# Patient Record
Sex: FEMALE | Race: WHITE
[De-identification: names, ages, dates, MRNs, and addresses within clinical notes are randomized per-mention and may not be internally consistent; named-entity substitution may affect disease eponyms.]

---

## 2018-12-02 ENCOUNTER — HOSPITAL ENCOUNTER (EMERGENCY)
Age: 68
Discharge: HOME | End: 2018-12-02

## 2018-12-02 ENCOUNTER — HOSPITAL ENCOUNTER (EMERGENCY)
Dept: HOSPITAL 91 - E/R | Age: 68
Discharge: HOME | End: 2018-12-02
Payer: COMMERCIAL

## 2018-12-02 DIAGNOSIS — R10.813: ICD-10-CM

## 2018-12-02 DIAGNOSIS — R10.814: Primary | ICD-10-CM

## 2018-12-02 LAB
ADD MAN DIFF?: NO
ADD UMIC: NO
ALANINE AMINOTRANSFERASE: 28 IU/L (ref 13–69)
ALBUMIN/GLOBULIN RATIO: 1.32
ALBUMIN: 4.5 G/DL (ref 3.3–4.9)
ALKALINE PHOSPHATASE: 85 IU/L (ref 42–121)
ANION GAP: 10 (ref 5–13)
ASPARTATE AMINO TRANSFERASE: 38 IU/L (ref 15–46)
BASOPHIL #: 0 10^3/UL (ref 0–0.1)
BASOPHILS %: 0.6 % (ref 0–2)
BILIRUBIN,DIRECT: 0 MG/DL (ref 0–0.2)
BILIRUBIN,TOTAL: 0.2 MG/DL (ref 0.2–1.3)
BLOOD UREA NITROGEN: 14 MG/DL (ref 7–20)
CALCIUM: 9.4 MG/DL (ref 8.4–10.2)
CARBON DIOXIDE: 25 MMOL/L (ref 21–31)
CHLORIDE: 104 MMOL/L (ref 97–110)
CREATININE: 0.53 MG/DL (ref 0.44–1)
EOSINOPHILS #: 0.2 10^3/UL (ref 0–0.5)
EOSINOPHILS %: 3.3 % (ref 0–7)
GLOBULIN: 3.4 G/DL (ref 1.3–3.2)
GLUCOSE: 103 MG/DL (ref 70–220)
HEMATOCRIT: 42.1 % (ref 37–47)
HEMOGLOBIN: 13.7 G/DL (ref 12–16)
IMMATURE GRANS #M: 0.01 10^3/UL (ref 0–0.03)
IMMATURE GRANS % (M): 0.2 % (ref 0–0.43)
INR: 0.87
LIPASE: 148 U/L (ref 23–300)
LYMPHOCYTES #: 2.6 10^3/UL (ref 0.8–2.9)
LYMPHOCYTES %: 41 % (ref 15–51)
MEAN CORPUSCULAR HEMOGLOBIN: 28.1 PG (ref 29–33)
MEAN CORPUSCULAR HGB CONC: 32.5 G/DL (ref 32–37)
MEAN CORPUSCULAR VOLUME: 86.3 FL (ref 82–101)
MEAN PLATELET VOLUME: 10.1 FL (ref 7.4–10.4)
MONOCYTE #: 0.4 10^3/UL (ref 0.3–0.9)
MONOCYTES %: 5.9 % (ref 0–11)
NEUTROPHIL #: 3.1 10^3/UL (ref 1.6–7.5)
NEUTROPHILS %: 49 % (ref 39–77)
NUCLEATED RED BLOOD CELLS #: 0 10^3/UL (ref 0–0)
NUCLEATED RED BLOOD CELLS%: 0 /100WBC (ref 0–0)
PLATELET COUNT: 323 10^3/UL (ref 140–415)
POTASSIUM: 4.5 MMOL/L (ref 3.5–5.1)
PROTIME: 11.9 SEC (ref 11.9–14.9)
PT RATIO: 0.9
RED BLOOD COUNT: 4.88 10^6/UL (ref 4.2–5.4)
RED CELL DISTRIBUTION WIDTH: 12.4 % (ref 11.5–14.5)
SODIUM: 139 MMOL/L (ref 135–144)
TOTAL PROTEIN: 7.9 G/DL (ref 6.1–8.1)
TROPONIN-I: < 0.012 NG/ML (ref 0–0.12)
UR ASCORBIC ACID: 20 MG/DL
UR BILIRUBIN (DIP): NEGATIVE MG/DL
UR BLOOD (DIP): NEGATIVE MG/DL
UR CLARITY: CLEAR
UR COLOR: YELLOW
UR GLUCOSE (DIP): NEGATIVE MG/DL
UR KETONES (DIP): NEGATIVE MG/DL
UR LEUKOCYTE ESTERASE (DIP): NEGATIVE LEU/UL
UR NITRITE (DIP): NEGATIVE MG/DL
UR PH (DIP): 5 (ref 5–9)
UR SPECIFIC GRAVITY (DIP): 1.01 (ref 1–1.03)
UR TOTAL PROTEIN (DIP): NEGATIVE MG/DL
UR UROBILINOGEN (DIP): NEGATIVE MG/DL
WHITE BLOOD COUNT: 6.4 10^3/UL (ref 4.8–10.8)

## 2018-12-02 PROCEDURE — 99285 EMERGENCY DEPT VISIT HI MDM: CPT

## 2018-12-02 PROCEDURE — 85025 COMPLETE CBC W/AUTO DIFF WBC: CPT

## 2018-12-02 PROCEDURE — 85610 PROTHROMBIN TIME: CPT

## 2018-12-02 PROCEDURE — 36415 COLL VENOUS BLD VENIPUNCTURE: CPT

## 2018-12-02 PROCEDURE — 71045 X-RAY EXAM CHEST 1 VIEW: CPT

## 2018-12-02 PROCEDURE — 80053 COMPREHEN METABOLIC PANEL: CPT

## 2018-12-02 PROCEDURE — 81003 URINALYSIS AUTO W/O SCOPE: CPT

## 2018-12-02 PROCEDURE — 84484 ASSAY OF TROPONIN QUANT: CPT

## 2018-12-02 PROCEDURE — 93005 ELECTROCARDIOGRAM TRACING: CPT

## 2018-12-02 PROCEDURE — 74176 CT ABD & PELVIS W/O CONTRAST: CPT

## 2018-12-02 PROCEDURE — 83690 ASSAY OF LIPASE: CPT

## 2018-12-02 RX ADMIN — ALUMINUM HYDROXIDE, MAGNESIUM HYDROXIDE, DIMETHICONE 1 ML: 200; 200; 20 SUSPENSION ORAL at 10:56

## 2019-06-14 ENCOUNTER — HOSPITAL ENCOUNTER (EMERGENCY)
Dept: HOSPITAL 10 - E/R | Age: 69
Discharge: HOME | End: 2019-06-14
Payer: COMMERCIAL

## 2019-06-14 ENCOUNTER — HOSPITAL ENCOUNTER (EMERGENCY)
Dept: HOSPITAL 91 - E/R | Age: 69
Discharge: HOME | End: 2019-06-14
Payer: COMMERCIAL

## 2019-06-14 VITALS
WEIGHT: 154.1 LBS | BODY MASS INDEX: 28.36 KG/M2 | HEIGHT: 62 IN | WEIGHT: 154.1 LBS | HEIGHT: 62 IN | BODY MASS INDEX: 28.36 KG/M2

## 2019-06-14 VITALS — RESPIRATION RATE: 15 BRPM | DIASTOLIC BLOOD PRESSURE: 85 MMHG | SYSTOLIC BLOOD PRESSURE: 155 MMHG | HEART RATE: 65 BPM

## 2019-06-14 DIAGNOSIS — R10.13: Primary | ICD-10-CM

## 2019-06-14 DIAGNOSIS — R30.0: ICD-10-CM

## 2019-06-14 LAB
ADD MAN DIFF?: NO
ADD UMIC: YES
ALANINE AMINOTRANSFERASE: 25 IU/L (ref 13–69)
ALBUMIN/GLOBULIN RATIO: 1.15
ALBUMIN: 4.4 G/DL (ref 3.3–4.9)
ALKALINE PHOSPHATASE: 85 IU/L (ref 42–121)
ANION GAP: 8 (ref 5–13)
ASPARTATE AMINO TRANSFERASE: 36 IU/L (ref 15–46)
BASOPHIL #: 0.1 10^3/UL (ref 0–0.1)
BASOPHILS %: 0.5 % (ref 0–2)
BILIRUBIN,DIRECT: 0 MG/DL (ref 0–0.2)
BILIRUBIN,TOTAL: 0.4 MG/DL (ref 0.2–1.3)
BLOOD UREA NITROGEN: 14 MG/DL (ref 7–20)
CALCIUM: 9.6 MG/DL (ref 8.4–10.2)
CARBON DIOXIDE: 25 MMOL/L (ref 21–31)
CHLORIDE: 106 MMOL/L (ref 97–110)
CREATININE: 0.61 MG/DL (ref 0.44–1)
EOSINOPHILS #: 0.3 10^3/UL (ref 0–0.5)
EOSINOPHILS %: 2.7 % (ref 0–7)
GLOBULIN: 3.8 G/DL (ref 1.3–3.2)
GLUCOSE: 93 MG/DL (ref 70–220)
HEMATOCRIT: 39.3 % (ref 37–47)
HEMOGLOBIN: 13 G/DL (ref 12–16)
IMMATURE GRANS #M: 0.03 10^3/UL (ref 0–0.03)
IMMATURE GRANS % (M): 0.3 % (ref 0–0.43)
LIPASE: 121 U/L (ref 23–300)
LYMPHOCYTES #: 3.5 10^3/UL (ref 0.8–2.9)
LYMPHOCYTES %: 33.7 % (ref 15–51)
MEAN CORPUSCULAR HEMOGLOBIN: 28.4 PG (ref 29–33)
MEAN CORPUSCULAR HGB CONC: 33.1 G/DL (ref 32–37)
MEAN CORPUSCULAR VOLUME: 85.8 FL (ref 82–101)
MEAN PLATELET VOLUME: 9.9 FL (ref 7.4–10.4)
MONOCYTE #: 0.6 10^3/UL (ref 0.3–0.9)
MONOCYTES %: 5.9 % (ref 0–11)
NEUTROPHIL #: 5.9 10^3/UL (ref 1.6–7.5)
NEUTROPHILS %: 56.9 % (ref 39–77)
NUCLEATED RED BLOOD CELLS #: 0 10^3/UL (ref 0–0)
NUCLEATED RED BLOOD CELLS%: 0 /100WBC (ref 0–0)
PLATELET COUNT: 309 10^3/UL (ref 140–415)
POTASSIUM: 3.9 MMOL/L (ref 3.5–5.1)
RED BLOOD COUNT: 4.58 10^6/UL (ref 4.2–5.4)
RED CELL DISTRIBUTION WIDTH: 12.4 % (ref 11.5–14.5)
SODIUM: 139 MMOL/L (ref 135–144)
TOTAL PROTEIN: 8.2 G/DL (ref 6.1–8.1)
TROPONIN-I: < 0.012 NG/ML (ref 0–0.12)
UR ASCORBIC ACID: 20 MG/DL
UR BILIRUBIN (DIP): NEGATIVE MG/DL
UR BLOOD (DIP): NEGATIVE MG/DL
UR CLARITY: (no result)
UR COLOR: YELLOW
UR GLUCOSE (DIP): NEGATIVE MG/DL
UR KETONES (DIP): NEGATIVE MG/DL
UR LEUKOCYTE ESTERASE (DIP): (no result) LEU/UL
UR MUCUS: (no result) /HPF
UR NITRITE (DIP): NEGATIVE MG/DL
UR PH (DIP): 5 (ref 5–9)
UR RBC: 0 /HPF (ref 0–5)
UR SPECIFIC GRAVITY (DIP): 1.01 (ref 1–1.03)
UR TOTAL PROTEIN (DIP): NEGATIVE MG/DL
UR UROBILINOGEN (DIP): NEGATIVE MG/DL
UR WBC: 2 /HPF (ref 0–5)
URINE LEUKOCYTE EST (DIP) POC: (no result)
URINE PH (DIP) POC: 5.5 (ref 5–8.5)
WHITE BLOOD COUNT: 10.3 10^3/UL (ref 4.8–10.8)

## 2019-06-14 PROCEDURE — 87086 URINE CULTURE/COLONY COUNT: CPT

## 2019-06-14 PROCEDURE — 83690 ASSAY OF LIPASE: CPT

## 2019-06-14 PROCEDURE — 84484 ASSAY OF TROPONIN QUANT: CPT

## 2019-06-14 PROCEDURE — 96374 THER/PROPH/DIAG INJ IV PUSH: CPT

## 2019-06-14 PROCEDURE — 81003 URINALYSIS AUTO W/O SCOPE: CPT

## 2019-06-14 PROCEDURE — 93005 ELECTROCARDIOGRAM TRACING: CPT

## 2019-06-14 PROCEDURE — 99284 EMERGENCY DEPT VISIT MOD MDM: CPT

## 2019-06-14 PROCEDURE — 81001 URINALYSIS AUTO W/SCOPE: CPT

## 2019-06-14 PROCEDURE — 85025 COMPLETE CBC W/AUTO DIFF WBC: CPT

## 2019-06-14 PROCEDURE — 36415 COLL VENOUS BLD VENIPUNCTURE: CPT

## 2019-06-14 PROCEDURE — 80053 COMPREHEN METABOLIC PANEL: CPT

## 2019-06-14 RX ADMIN — DICYCLOMINE HYDROCHLORIDE 1 MG: 10 CAPSULE ORAL at 20:20

## 2019-06-14 RX ADMIN — ALUMINUM HYDROXIDE, MAGNESIUM HYDROXIDE, DIMETHICONE 1 ML: 200; 200; 20 SUSPENSION ORAL at 20:21

## 2019-06-14 RX ADMIN — FAMOTIDINE 1 MG: 10 INJECTION, SOLUTION INTRAVENOUS at 20:20

## 2019-06-14 NOTE — ERD
ER Documentation


Chief Complaint


Chief Complaint





PT REPORTS LLQ PAIN RADIATING TO BACK WITH FREQUENT URINATION





HPI


68-year-old female who presents to the emergency room complaining of epigastric 


abdominal pain.  She describes several weeks if not longer of epigastric 


abdominal discomfort.  Patient states that is always postprandial usually with 


fried or fatty foods.  The patient also describes approximately 1 week of 


dysuria and possible frequent urination.  No fevers or chills, no left flank 


pain, right flank pain.  She denies any cough or shortness of breath.  Symptoms 


are moderate currently.





ROS


All systems reviewed and are negative except as per history of present illness.





Medications


Home Meds


Active Scripts


Famotidine* (Pepcid*) 20 Mg Tablet, 20 MG PO BID for 4 Days, TAB


   Prov:BRANDAN POOLE MD         6/14/19


Polyethylene Glycol* (Miralax*) 17 Gm Powd.pack, 17 GM PO DAILY, #7


   Prov:GEETA BERMAN MD         12/2/18


Docusate Sodium* (Colace*) 100 Mg Capsule, 100 MG PO BID, #60 CAP


   Prov:GEETA BERMAN MD         12/2/18


Nitrofurantoin Monohyd Macrocr* (Macrobid*) 100 Mg Capsr, 100 MG PO BID for 7 


Days, CAP


   Prov:LYNNE POLLARD PA-C         8/14/16





Allergies


Allergies:  


Coded Allergies:  


     No Known Allergies (Verified  Allergy, Unknown, 12/2/18)





PMhx/Soc


History of Surgery:  Yes (HYSTERECTOMY )


Anesthesia Reaction:  No


Hx Neurological Disorder:  No


Hx Respiratory Disorders:  No


Hx Cardiac Disorders:  No


Hx Psychiatric Problems:  Yes (DEPRESSION )


Hx Miscellaneous Medical Probl:  No


Hx Alcohol Use:  No


Hx Substance Use:  No


Hx Tobacco Use:  No


Smoking Status:  Never smoker





FmHx


Family History:  No diabetes





Physical Exam


Vitals





Vital Signs


  Date      Temp  Pulse  Resp  B/P (MAP)   Pulse Ox  O2          O2 Flow    FiO2


Time                                                 Delivery    Rate


   6/14/19           64    17      155/85        97  Room Air


     21:03                          (108)


   6/14/19  98.6     84    16      155/74       100


     18:23                          (101)





Physical Exam


General: Well developed, well nourished, no acute distress


Head: Normocephalic, atraumatic.


Eyes: Pupils equally reactive, EOM intact


ENT: Moist mucous membranes


Neck: Supple, no lymphadenopathy


Respiratory: Lungs clear bilaterally, no distress


Cardiovascular: RRR, no murmurs, rubs, or gallops


Abdominal: Soft, non-tender, non-distended, no peritoneal signs, negative Hernandez


sign


Back: No CVAT bilaterally


: Deferred


MSK: No edema, no unilateral swelling, 5/5 strength


Neurologic: Alert and oriented, moving all extremities, normal speech, no focal 


weakness, no cerebellar signs


Skin: No rash


Psych: Normal mood


Result Diagram:  


6/14/19 2015 6/14/19 2015





Results 24 hrs





Laboratory Tests


      Test
                                  6/14/19
20:15  6/14/19
20:24


      White Blood Count                      10.3 10^3/ul


      Red Blood Count                        4.58 10^6/ul


      Hemoglobin                                13.0 g/dl


      Hematocrit                                   39.3 %


      Mean Corpuscular Volume                     85.8 fl


      Mean Corpuscular Hemoglobin                 28.4 pg


      Mean Corpuscular Hemoglobin
Concent      33.1 g/dl 
  



      Red Cell Distribution Width                  12.4 %


      Platelet Count                          309 10^3/UL


      Mean Platelet Volume                         9.9 fl


      Immature Granulocytes %                     0.300 %


      Neutrophils %                                56.9 %


      Lymphocytes %                                33.7 %


      Monocytes %                                   5.9 %


      Eosinophils %                                 2.7 %


      Basophils %                                   0.5 %


      Nucleated Red Blood Cells %             0.0 /100WBC


      Immature Granulocytes #               0.030 10^3/ul


      Neutrophils #                           5.9 10^3/ul


      Lymphocytes #                           3.5 10^3/ul


      Monocytes #                             0.6 10^3/ul


      Eosinophils #                           0.3 10^3/ul


      Basophils #                             0.1 10^3/ul


      Nucleated Red Blood Cells #             0.0 10^3/ul


      Urine Color                          YELLOW


      Urine Clarity
                       SLIGHTLY
CLOUDY  



      Urine pH                                        5.0


      Urine Specific Gravity                        1.010


      Urine Ketones                        NEGATIVE mg/dL


      Urine Nitrite                        NEGATIVE mg/dL


      Urine Bilirubin                      NEGATIVE mg/dL


      Urine Urobilinogen                   NEGATIVE mg/dL


      Urine Leukocyte Esterase                  1+ Ilana/ul


      Urine Microscopic RBC                        0 /HPF


      Urine Microscopic WBC                        2 /HPF


      Urine Mucus                          FEW /HPF


      Urine Hemoglobin                     NEGATIVE mg/dL


      Urine Glucose                        NEGATIVE mg/dL


      Urine Total Protein                  NEGATIVE mg/dl


      Sodium Level                             139 mmol/L


      Potassium Level                          3.9 mmol/L


      Chloride Level                           106 mmol/L


      Carbon Dioxide Level                      25 mmol/L


      Anion Gap                                         8


      Blood Urea Nitrogen                        14 mg/dl


      Creatinine                               0.61 mg/dl


      Est Glomerular Filtrat Rate
mL/min   > 60 mL/min 
    



      Glucose Level                              93 mg/dl


      Calcium Level                             9.6 mg/dl


      Total Bilirubin                           0.4 mg/dl


      Direct Bilirubin                         0.00 mg/dl


      Indirect Bilirubin                        0.4 mg/dl


      Aspartate Amino Transf
(AST/SGOT)          36 IU/L 
  



      Alanine Aminotransferase
(ALT/SGPT)        25 IU/L 
  



      Alkaline Phosphatase                        85 IU/L


      Troponin I                           < 0.012 ng/ml


      Total Protein                              8.2 g/dl


      Albumin                                    4.4 g/dl


      Globulin                                  3.80 g/dl


      Albumin/Globulin Ratio                         1.15


      Lipase                                      121 U/L


      Bedside Urine pH (LAB)                                         5.5


      Bedside Urine Protein (LAB)                           Negative


      Bedside Urine Glucose (UA)                            Negative


      Bedside Urine Ketones (LAB)                           Negative


      Bedside Urine Blood                                   Trace-lysed


      Bedside Urine Nitrite (LAB)                           Negative


      Bedside Urine Leukocyte
Esterase (L  
                         1+ 






Current Medications


 Medications
   Dose
          Sig/Oli
       Start Time
   Status  Last


 (Trade)       Ordered        Route
 PRN     Stop Time              Admin
Dose


                              Reason                                Admin


 Famotidine
    20 mg          ONCE  STAT
    6/14/19       DC           6/14/19


(Pepcid Iv)                   IV
            20:16
                       20:20



                                             6/14/19 20:17


                40 ml          ONCE  STAT
    6/14/19       DC           6/14/19


Miscellaneous                 PO
            20:16
                       20:21




 Medication
                                6/14/19 20:17


 (Gi Cocktail


(2))


 Dicyclomine    10 mg          ONCE  ONCE
    6/14/19       DC           6/14/19


HCl
                          PO
            20:30
                       20:20



(Bentyl)                                     6/14/19 20:31








Procedures/MDM


EKG, MONITORS, & DIAGNOSTIC IMAGING:


EKG: I reviewed and interpreted a 12-lead EKG. 


Rhythm: Normal sinus rhythm


ST Changes: No contiguous ST segment elevations


T waves: No contiguous T wave inversions


Impression: No evidence of acute cardiac ischemia





LAB INTERPRETATION:


I reviewed the laboratory testing and it shows no evidence of acute process





MEDICAL DECISION MAKING:


Patient presents with a multitude of complaints of her subacute timeframe.  Her 


epigastric abdominal discomfort seems to be consistent with likely dyspepsia 


versus peptic ulcer disease versus GERD.  Low concern for acute cholecystitis, 


cholelithiasis or biliary colic.  No indication for ultrasound or CT imaging at 


this time given benign abdominal examination.  Unclear etiology of the patient's


dysuria.  The patient could have potentially have a UTI.  No signs of 


pyelonephritis.  Symptoms are again subacute.  Patient has epigastric 


discomfort, no exertional symptoms, very low clinical concern for cardiac et


iology though EKG and troponin would be appropriate.





ER COURSE:


* Patient given a GI cocktail with dramatic improvement of symptomatology.  


  Laboratory testing and diagnostic imaging is unrevealing.


* Urinalysis not consistent with UTI.  Urine culture is been sent.  No 


  indication at this time for empiric antibiotics given the risks outweigh the 


  benefits.


* Patient can be safely discharged and was advised to follow-up with 


  gastroenterology for endoscopy.





CONSULTATION:


None





DISPOSITION PLAN:


The patient does not have an identifiable emergent medical condition that 


warrants inpatient hospitalization at this time.  The patient is deemed safe for


discharge with outpatient follow-up.





We discussed follow up with the patient's primary care doctor within 24 to 48 


hours as needed.  We also discussed return to the emergency room for worsening 


symptoms or worsening condition.





Outpatient referral: Gastroenterology





Discharge Medications:


Pepcid





Departure


Diagnosis:  


   Primary Impression:  


   Epigastric abdominal pain


   Additional Impressions:  


   Dyspepsia


   Dysuria


Condition:  Stable


Patient Instructions:  Epigastric Pain (Uncertain Cause)


Referrals:  


GISSELLE HORTA MD








[facility]


YOU HAVE RECEIVED A MEDICAL SCREENING EXAM AND THE RESULTS INDICATE THAT YOU DO 


NOT HAVE A CONDITION THAT REQUIRES URGENT TREATMENT IN THE EMERGENCY DEPARTMENT.





FURTHER EVALUATION AND TREATMENT OF YOUR CONDITION CAN WAIT UNTIL YOU ARE SEEN 


IN YOUR DOCTORS OFFICE WITHIN THE NEXT 1-2 DAYS. IT IS YOUR RESPONSIBILITY TO 


MAKE AN APPOINTMENT FOR FOLOW-UP CARE.





IF YOU HAVE A PRIMARY DOCTOR


--you should call your primary doctor and schedule an appointment





IF YOU DO NOT HAVE A PRIMARY DOCTOR YOU CAN CALL OUR PHYSICIAN REFERRAL HOTLINE 


AT


 [phone] 





IF YOU CAN NOT AFFORD TO SEE A PHYSICIAN YOU CAN CHOSE FROM THE FOLLOWING 


[facility] CL[facility]ICS





[facility] [phone] [address] [facility] [facility] [facility]. [facility] [phone] [address] [facility]. [facility] [phone] [address] [facility]. [facility] [phone] [address] [facility]. [facility] [phone] [address] [facility]. [facility]. [phone] [address] [facility]


YOU HAVE RECEIVED A MEDICAL SCREENING EXAM AND THE RESULTS INDICATE THAT YOU DO 


NOT HAVE A CONDITION THAT REQUIRES URGENT TREATMENT IN THE EMERGENCY DEPARTMENT.





FURTHER EVALUATION AND TREATMENT OF YOUR CONDITION CAN WAIT UNTIL YOU ARE SEEN 


IN YOUR DOCTORS OFFICE WITHIN THE NEXT 1-2 DAYS. IT IS YOUR RESPONSIBILITY TO 


MAKE AN APPOINTMENT FOR FOLOW-UP CARE.





IF YOU HAVE A PRIMARY DOCTOR


--you should call your primary doctor and schedule and appointment





IF YOU DO NOT HAVE A PRIMARY DOCTOR YOU CAN CALL OUR PHYSICIAN REFERRAL HOTLINE 


AT [phone].





IF YOU CAN NOT AFFORD TO SEE A PHYSICIAN YOU CAN CHOSE FROM THE FOLLOWING [facility]


INSTITUTIONS:





[facility]


[address] [place] [address]





[facility]


[address] [place] [address]





[facility]


[address] [place] [address]





Additional Instructions:  


Call your primary care doctor TOMORROW for an appointment during the next 1 


WEEK.Tell the  that you were referred from this facility.See the doctor


sooner or return here if your  condition worsens before your appointment time.











BRANDAN POOLE MD          Jun 14, 2019 21:25

## 2019-06-21 ENCOUNTER — HOSPITAL ENCOUNTER (EMERGENCY)
Dept: HOSPITAL 91 - E/R | Age: 69
Discharge: HOME | End: 2019-06-21
Payer: COMMERCIAL

## 2019-06-21 ENCOUNTER — HOSPITAL ENCOUNTER (EMERGENCY)
Dept: HOSPITAL 10 - E/R | Age: 69
Discharge: HOME | End: 2019-06-21
Payer: COMMERCIAL

## 2019-06-21 VITALS — HEART RATE: 81 BPM | DIASTOLIC BLOOD PRESSURE: 81 MMHG | SYSTOLIC BLOOD PRESSURE: 144 MMHG | RESPIRATION RATE: 16 BRPM

## 2019-06-21 VITALS
BODY MASS INDEX: 29.34 KG/M2 | WEIGHT: 155.43 LBS | WEIGHT: 155.43 LBS | HEIGHT: 61 IN | BODY MASS INDEX: 29.34 KG/M2 | HEIGHT: 61 IN

## 2019-06-21 DIAGNOSIS — N30.00: Primary | ICD-10-CM

## 2019-06-21 LAB
ADD MAN DIFF?: NO
ADD UMIC: YES
ALANINE AMINOTRANSFERASE: 33 IU/L (ref 13–69)
ALBUMIN/GLOBULIN RATIO: 1.33
ALBUMIN: 4.4 G/DL (ref 3.3–4.9)
ALKALINE PHOSPHATASE: 83 IU/L (ref 42–121)
ANION GAP: 12 (ref 5–13)
ASPARTATE AMINO TRANSFERASE: 38 IU/L (ref 15–46)
BASOPHIL #: 0 10^3/UL (ref 0–0.1)
BASOPHILS %: 0.4 % (ref 0–2)
BILIRUBIN,DIRECT: 0 MG/DL (ref 0–0.2)
BILIRUBIN,TOTAL: 0.3 MG/DL (ref 0.2–1.3)
BLOOD UREA NITROGEN: 13 MG/DL (ref 7–20)
CALCIUM: 9.7 MG/DL (ref 8.4–10.2)
CARBON DIOXIDE: 24 MMOL/L (ref 21–31)
CHLORIDE: 105 MMOL/L (ref 97–110)
CREATININE: 0.69 MG/DL (ref 0.44–1)
EOSINOPHILS #: 0.2 10^3/UL (ref 0–0.5)
EOSINOPHILS %: 2.5 % (ref 0–7)
GLOBULIN: 3.3 G/DL (ref 1.3–3.2)
GLUCOSE: 110 MG/DL (ref 70–220)
HEMATOCRIT: 38.4 % (ref 37–47)
HEMOGLOBIN: 12.8 G/DL (ref 12–16)
IMMATURE GRANS #M: 0.02 10^3/UL (ref 0–0.03)
IMMATURE GRANS % (M): 0.2 % (ref 0–0.43)
LIPASE: 119 U/L (ref 23–300)
LYMPHOCYTES #: 3.2 10^3/UL (ref 0.8–2.9)
LYMPHOCYTES %: 35.2 % (ref 15–51)
MEAN CORPUSCULAR HEMOGLOBIN: 28.6 PG (ref 29–33)
MEAN CORPUSCULAR HGB CONC: 33.3 G/DL (ref 32–37)
MEAN CORPUSCULAR VOLUME: 85.9 FL (ref 82–101)
MEAN PLATELET VOLUME: 10 FL (ref 7.4–10.4)
MONOCYTE #: 0.6 10^3/UL (ref 0.3–0.9)
MONOCYTES %: 7 % (ref 0–11)
NEUTROPHIL #: 5 10^3/UL (ref 1.6–7.5)
NEUTROPHILS %: 54.7 % (ref 39–77)
NUCLEATED RED BLOOD CELLS #: 0 10^3/UL (ref 0–0)
NUCLEATED RED BLOOD CELLS%: 0 /100WBC (ref 0–0)
PLATELET COUNT: 333 10^3/UL (ref 140–415)
POTASSIUM: 4.3 MMOL/L (ref 3.5–5.1)
RED BLOOD COUNT: 4.47 10^6/UL (ref 4.2–5.4)
RED CELL DISTRIBUTION WIDTH: 12.4 % (ref 11.5–14.5)
SODIUM: 141 MMOL/L (ref 135–144)
TOTAL PROTEIN: 7.7 G/DL (ref 6.1–8.1)
TROPONIN-I: < 0.012 NG/ML (ref 0–0.12)
UR ASCORBIC ACID: 40 MG/DL
UR BILIRUBIN (DIP): NEGATIVE MG/DL
UR BLOOD (DIP): NEGATIVE MG/DL
UR CLARITY: (no result)
UR COLOR: (no result)
UR GLUCOSE (DIP): NEGATIVE MG/DL
UR KETONES (DIP): NEGATIVE MG/DL
UR LEUKOCYTE ESTERASE (DIP): (no result) LEU/UL
UR NITRITE (DIP): NEGATIVE MG/DL
UR PH (DIP): 5 (ref 5–9)
UR RBC: 1 /HPF (ref 0–5)
UR SPECIFIC GRAVITY (DIP): 1.01 (ref 1–1.03)
UR TOTAL PROTEIN (DIP): NEGATIVE MG/DL
UR UROBILINOGEN (DIP): NEGATIVE MG/DL
UR WBC: 2 /HPF (ref 0–5)
WHITE BLOOD COUNT: 9.1 10^3/UL (ref 4.8–10.8)

## 2019-06-21 PROCEDURE — 74176 CT ABD & PELVIS W/O CONTRAST: CPT

## 2019-06-21 PROCEDURE — 83690 ASSAY OF LIPASE: CPT

## 2019-06-21 PROCEDURE — 84484 ASSAY OF TROPONIN QUANT: CPT

## 2019-06-21 PROCEDURE — 99285 EMERGENCY DEPT VISIT HI MDM: CPT

## 2019-06-21 PROCEDURE — 93005 ELECTROCARDIOGRAM TRACING: CPT

## 2019-06-21 PROCEDURE — 81001 URINALYSIS AUTO W/SCOPE: CPT

## 2019-06-21 PROCEDURE — 96375 TX/PRO/DX INJ NEW DRUG ADDON: CPT

## 2019-06-21 PROCEDURE — 85025 COMPLETE CBC W/AUTO DIFF WBC: CPT

## 2019-06-21 PROCEDURE — 80053 COMPREHEN METABOLIC PANEL: CPT

## 2019-06-21 PROCEDURE — 36415 COLL VENOUS BLD VENIPUNCTURE: CPT

## 2019-06-21 PROCEDURE — 96374 THER/PROPH/DIAG INJ IV PUSH: CPT

## 2019-06-21 RX ADMIN — ONDANSETRON HYDROCHLORIDE 1 MG: 2 INJECTION, SOLUTION INTRAMUSCULAR; INTRAVENOUS at 20:06

## 2019-06-21 NOTE — ERD
ER Documentation


Chief Complaint


Chief Complaint





lower AP w/ nausea, dysuria x1 day. no diarrhea/vomiting





HPI


Patient is a 68-year-old female with no medical problems who presents with 


abdominal pain.  The patient has left upper quadrant abdominal pain which comes 


and goes.  She feels like her abdomen is "swollen".  She said that she has a 


burning type pain.  She was seen on June 14 and had labs done at that time but 


no imaging was done.  Upon review of old medical records this is the patient's 


fourth visit to the ER since 2016.  She does not currently have a primary 


doctor.





ROS


All systems reviewed and are negative except as per history of present illness.





Medications


Home Meds


Active Scripts


Docusate Sodium* (Colace*) 100 Mg Capsule, 100 MG PO TID, #30 CAP


   Prov:TANK HAMMONDS MD         6/21/19


Ibuprofen* (Motrin*) 600 Mg Tab, 600 MG PO Q6H PRN for PAIN AND OR ELEVATED 


TEMP, #30 TAB


   Prov:TANK HAMMONDS MD         6/21/19


Cephalexin* (Keflex*) 500 Mg Capsule, 500 MG PO QID for 7 Days, CAP


   Prov:TANK HAMMONDS MD         6/21/19


Famotidine* (Pepcid*) 20 Mg Tablet, 20 MG PO BID for 4 Days, TAB


   Prov:BRANDAN POOLE MD         6/14/19


Polyethylene Glycol* (Miralax*) 17 Gm Powd.pack, 17 GM PO DAILY, #7


   Prov:GEETA BERMAN MD         12/2/18


Docusate Sodium* (Colace*) 100 Mg Capsule, 100 MG PO BID, #60 CAP


   Prov:GEETA BERMAN MD         12/2/18


Nitrofurantoin Monohyd Macrocr* (Macrobid*) 100 Mg Capsr, 100 MG PO BID for 7 


Days, CAP


   Prov:LYNNE POLLARD PA-C         8/14/16





Allergies


Allergies:  


Coded Allergies:  


     No Known Allergies (Verified  Allergy, Unknown, 12/2/18)





PMhx/Soc


History of Surgery:  Yes (HYSTERECTOMY )


Anesthesia Reaction:  No


Hx Neurological Disorder:  No


Hx Respiratory Disorders:  No


Hx Cardiac Disorders:  No


Hx Psychiatric Problems:  Yes (DEPRESSION )


Hx Miscellaneous Medical Probl:  No


Hx Alcohol Use:  No


Hx Substance Use:  No


Hx Tobacco Use:  No


Smoking Status:  Never smoker





FmHx


Family History:  No diabetes





Physical Exam


Vitals





Vital Signs


  Date      Temp  Pulse  Resp  B/P (MAP)   Pulse Ox  O2          O2 Flow    FiO2


Time                                                 Delivery    Rate


   6/21/19  97.1     85    16      153/78        97


     19:47                          (103)


   6/21/19  97.1     77    16      153/78        97


     19:31                          (103)





Physical Exam


Const:   No acute distress


Head:   Atraumatic 


Eyes:    Normal Conjunctiva


ENT:    Normal External Ears, Nose and Mouth.


Neck:               Full range of motion. No meningismus.


Resp:   Clear to auscultation bilaterally


Cardio:   Regular rate and rhythm, no murmurs


Abd:    Upper abdominal tenderness to palpation without rebound or guarding


Skin:   No petechiae or rashes


Back:   No midline or flank tenderness


Ext:    No cyanosis, or edema


Neur:   Awake and alert


Psych:    Normal Mood and Affect


Result Diagram:  


6/21/19 2000 6/21/19 2000





Results 24 hrs





Laboratory Tests


              Test
                                  6/21/19
20:00


              White Blood Count                       9.1 10^3/ul


              Red Blood Count                        4.47 10^6/ul


              Hemoglobin                                12.8 g/dl


              Hematocrit                                   38.4 %


              Mean Corpuscular Volume                     85.9 fl


              Mean Corpuscular Hemoglobin                 28.6 pg


              Mean Corpuscular Hemoglobin
Concent      33.3 g/dl 



              Red Cell Distribution Width                  12.4 %


              Platelet Count                          333 10^3/UL


              Mean Platelet Volume                        10.0 fl


              Immature Granulocytes %                     0.200 %


              Neutrophils %                                54.7 %


              Lymphocytes %                                35.2 %


              Monocytes %                                   7.0 %


              Eosinophils %                                 2.5 %


              Basophils %                                   0.4 %


              Nucleated Red Blood Cells %             0.0 /100WBC


              Immature Granulocytes #               0.020 10^3/ul


              Neutrophils #                           5.0 10^3/ul


              Lymphocytes #                           3.2 10^3/ul


              Monocytes #                             0.6 10^3/ul


              Eosinophils #                           0.2 10^3/ul


              Basophils #                             0.0 10^3/ul


              Nucleated Red Blood Cells #             0.0 10^3/ul


              Urine Color                          VERONICA


              Urine Clarity                        CLOUDY


              Urine pH                                        5.0


              Urine Specific Gravity                        1.015


              Urine Ketones                        NEGATIVE mg/dL


              Urine Nitrite                        NEGATIVE mg/dL


              Urine Bilirubin                      NEGATIVE mg/dL


              Urine Urobilinogen                   NEGATIVE mg/dL


              Urine Leukocyte Esterase             TRACE Ilana/ul


              Urine Microscopic RBC                        1 /HPF


              Urine Microscopic WBC                        2 /HPF


              Urine Hemoglobin                     NEGATIVE mg/dL


              Urine Glucose                        NEGATIVE mg/dL


              Urine Total Protein                  NEGATIVE mg/dl


              Sodium Level                             141 mmol/L


              Potassium Level                          4.3 mmol/L


              Chloride Level                           105 mmol/L


              Carbon Dioxide Level                      24 mmol/L


              Anion Gap                                        12


              Blood Urea Nitrogen                        13 mg/dl


              Creatinine                               0.69 mg/dl


              Est Glomerular Filtrat Rate
mL/min   > 60 mL/min 



              Glucose Level                             110 mg/dl


              Calcium Level                             9.7 mg/dl


              Total Bilirubin                           0.3 mg/dl


              Direct Bilirubin                         0.00 mg/dl


              Indirect Bilirubin                        0.3 mg/dl


              Aspartate Amino Transf
(AST/SGOT)          38 IU/L 



              Alanine Aminotransferase
(ALT/SGPT)        33 IU/L 



              Alkaline Phosphatase                        83 IU/L


              Troponin I                           < 0.012 ng/ml


              Total Protein                              7.7 g/dl


              Albumin                                    4.4 g/dl


              Globulin                                  3.30 g/dl


              Albumin/Globulin Ratio                         1.33


              Lipase                                      119 U/L





Current Medications


 Medications
   Dose
          Sig/Oli
       Start Time
   Status  Last


 (Trade)       Ordered        Route
 PRN     Stop Time              Admin
Dose


                              Reason                                Admin


 Morphine       4 mg           ONCE  STAT
    6/21/19       DC           6/21/19


Sulfate
                      IV
            19:56
                       20:06



(morphine)                                   6/21/19 19:57


 Ondansetron    4 mg           ONCE  STAT
    6/21/19       DC           6/21/19


HCl
  (Zofran                 IV
            19:56
                       20:06



Inj)                                         6/21/19 19:57








Procedures/MDM


EKG read by me: 


Rate/Rhythm: Regular rate and rhythm at a rate of 67


Intervals:    Normal


Impression:     No evidence of ischemia or arrhythmia





CT abdomen and pelvis read by radiology.





Patient is a 68-year-old female with no medical problems who presents with 


abdominal pain.  Laboratory studies are normal.  EKG is normal.  Troponin is 


negative.  LFTs and lipase are normal.  CT scan shows dysmotility but no signs 


of bowel obstruction.  I doubt appendicitis, cholecystitis, pancreatitis, or 


bowel obstruction.  I did offer the patient admission to the hospital but she 


refused and would prefer to go home at this time.  She was called for a E. coli 


infection from a few days ago with less than 10,000 colonies.  She wants a 


prescription for this although we discussed that this possibly was not enough to


consider true infection.  I believe is reasonable to treat her with Keflex twice


a day for 1 week and I will prescribe this.  The patient will be given a prescr


iption for ibuprofen and Colace as well.  She should follow-up with her primary 


doctor at the local clinics within 24 to 48 hours.  She can return sooner for 


any worsening symptoms.





Departure


Diagnosis:  


   Primary Impression:  


   Abdominal pain


   Abdominal location:  left upper quadrant  Qualified Codes:  R10.12 - Left 


   upper quadrant pain


   Additional Impression:  


   Urinary tract infection


   Urinary tract infection type:  acute cystitis  Hematuria presence:  without 


   hematuria  Qualified Codes:  N30.00 - Acute cystitis without hematuria


Condition:  Fair


Patient Instructions:  Abdominal Pain, Understanding Urinary Tract Infections 


(UTIs)


Referrals:  


Your doctor








[facility] CLIN[facility]





Additional Instructions:  


Call your primary care doctor TOMORROW for an appointment during the next 1-2 


days.See the doctor sooner or return here if your condition worsens before your 


appointment time.











ATNK HAMMONDS MD                Jun 21, 2019 23:04

## 2022-06-30 ENCOUNTER — OFFICE (OUTPATIENT)
Dept: URBAN - METROPOLITAN AREA CLINIC 67 | Facility: CLINIC | Age: 72
End: 2022-06-30

## 2022-06-30 VITALS — DIASTOLIC BLOOD PRESSURE: 85 MMHG | WEIGHT: 161 LBS | SYSTOLIC BLOOD PRESSURE: 135 MMHG | HEIGHT: 62 IN

## 2022-06-30 DIAGNOSIS — R10.13 EPIGASTRIC PAIN: ICD-10-CM

## 2022-06-30 DIAGNOSIS — R19.5 OCCULT BLOOD POSITIVE STOOL: ICD-10-CM

## 2022-06-30 DIAGNOSIS — K59.00 CONSTIPATION: ICD-10-CM

## 2022-06-30 DIAGNOSIS — R14.0 ABDOMINAL BLOATING: ICD-10-CM

## 2022-06-30 PROCEDURE — 99243 OFF/OP CNSLTJ NEW/EST LOW 30: CPT | Performed by: NURSE PRACTITIONER

## 2022-06-30 RX ORDER — OMEPRAZOLE 20 MG/1
CAPSULE, DELAYED RELEASE ORAL
Qty: 30 | Status: COMPLETED
Start: 2022-06-30 | End: 2024-01-10

## 2022-06-30 NOTE — SERVICEHPINOTES
This is a   71   year old  female   seen   in consultation at the request of Dr. SHARON POWERS  . Referred for evaluation of recent OB+ stools. Patient also endorses intermittent epigastric pain/bloating that failed Omeprazole qdx2 months recommended to her by an "emergency clinic". Also c/o intermittent constipation. Recent extensive blood work 5/2022 reviewed and unremarkable. 
br
br Has had an EGD and colonoscopy but >10 yrs ago, according to her recollection.

## 2022-08-30 ENCOUNTER — AMBULATORY SURGICAL CENTER (OUTPATIENT)
Dept: URBAN - METROPOLITAN AREA SURGERY 42 | Facility: SURGERY | Age: 72
End: 2022-08-30

## 2022-08-30 VITALS
HEIGHT: 62 IN | WEIGHT: 160 LBS | DIASTOLIC BLOOD PRESSURE: 74 MMHG | SYSTOLIC BLOOD PRESSURE: 164 MMHG | RESPIRATION RATE: 18 BRPM | TEMPERATURE: 97.7 F | OXYGEN SATURATION: 96 % | HEART RATE: 70 BPM

## 2022-08-30 DIAGNOSIS — R10.13 EPIGASTRIC PAIN: ICD-10-CM

## 2022-08-30 PROCEDURE — 45378 DIAGNOSTIC COLONOSCOPY: CPT | Performed by: INTERNAL MEDICINE

## 2022-08-30 PROCEDURE — 43239 EGD BIOPSY SINGLE/MULTIPLE: CPT | Performed by: INTERNAL MEDICINE

## 2022-08-30 NOTE — SERVICEHPINOTES
This is a 71 year old female seen in consultation at the request of Dr. SHARON POWERS. Referred for evaluation of recent OB+ stools. Patient also endorses intermittent epigastric pain/bloating that failed Omeprazole qdx2 months recommended to her by an "emergency clinic". Also c/o intermittent constipation. Recent extensive blood work 5/2022 reviewed and unremarkable. Has had an EGD and colonoscopy but >10 yrs ago, according to her recollection.

## 2024-01-10 ENCOUNTER — OFFICE (OUTPATIENT)
Dept: URBAN - METROPOLITAN AREA CLINIC 67 | Facility: CLINIC | Age: 74
End: 2024-01-10

## 2024-01-10 VITALS — DIASTOLIC BLOOD PRESSURE: 80 MMHG | WEIGHT: 158 LBS | SYSTOLIC BLOOD PRESSURE: 120 MMHG | HEIGHT: 62 IN

## 2024-01-10 DIAGNOSIS — R10.13 EPIGASTRIC PAIN: ICD-10-CM

## 2024-01-10 PROCEDURE — 99214 OFFICE O/P EST MOD 30 MIN: CPT | Performed by: INTERNAL MEDICINE

## 2024-01-10 NOTE — SERVICEHPINOTES
Patient previously seen with the following notes:
br 6/30/22 : (per Rosemarie Rider NP)br Referred for evaluation of recent OB+ stools. Patient also endorses intermittent epigastric pain/bloating that failed Omeprazole qdx2 months recommended to her by an "emergency clinic". Also c/o intermittent constipation. Recent extensive blood work 5/2022 reviewed and unremarkable. Has had an EGD and colonoscopy but >10 yrs ago, according to her recollection.
br
...
[facility] 7/2/22: CMP, CBC, lipase, CRP normal.Abdominal ultrasound 7/2/22: Liver is slightly enlarged with a coarse and dense echotexture compatible with steatosis without mass. Small gallbladder polyps without wall thickening stones, tenderness, or common bile duct enlargement. 11×18×14 mm hypoechoic right upper pole renal structure may be a solid structure is not optimally visualized. Recommend further assessment with MRI or CT scan as clinically indicated.
br ...
br EGD 8/30/22:Biopsies show Mild chronic gastritis. Negative respiratory. Negative for celiac disease.
br br    1/10/24: 
br Patient still having epigastric discomfort off and on.  When she takes Nexium over-the-counter it helps.  She had evaluation of the renal lesion by a CT scan that was told everything was benign.  Denies any new complaints.br